# Patient Record
Sex: FEMALE | Race: WHITE | URBAN - METROPOLITAN AREA
[De-identification: names, ages, dates, MRNs, and addresses within clinical notes are randomized per-mention and may not be internally consistent; named-entity substitution may affect disease eponyms.]

---

## 2018-09-04 ENCOUNTER — OFFICE VISIT (OUTPATIENT)
Dept: ORTHOPEDIC SURGERY | Age: 58
End: 2018-09-04

## 2018-09-04 VITALS
SYSTOLIC BLOOD PRESSURE: 156 MMHG | BODY MASS INDEX: 30.82 KG/M2 | HEART RATE: 63 BPM | DIASTOLIC BLOOD PRESSURE: 89 MMHG | WEIGHT: 185 LBS | HEIGHT: 65 IN

## 2018-09-04 DIAGNOSIS — M25.561 RIGHT KNEE PAIN, UNSPECIFIED CHRONICITY: Primary | ICD-10-CM

## 2018-09-04 DIAGNOSIS — M17.11 OSTEOARTHRITIS OF RIGHT KNEE, UNSPECIFIED OSTEOARTHRITIS TYPE: ICD-10-CM

## 2018-09-04 PROCEDURE — 20610 DRAIN/INJ JOINT/BURSA W/O US: CPT | Performed by: ORTHOPAEDIC SURGERY

## 2018-09-04 PROCEDURE — 99204 OFFICE O/P NEW MOD 45 MIN: CPT | Performed by: ORTHOPAEDIC SURGERY

## 2018-09-07 NOTE — PROGRESS NOTES
Cortisone injection: right knee     2cc methylprednisolone 40mg    Lot:[id number]  Exp:09/2019  ZOO:0138-4512-28    4cc ropivocaine 0.5%    KYLIE:[id number]  Exp:02/2022  Ndc: 36151-951-78
Review of Systems   Constitutional:        Recent weight change    Cardiovascular:        Hypertension    Musculoskeletal: Positive for joint pain. Right knee pain    All other systems reviewed and are negative.
stability: Stable to valgus and varus stress at 0° and 30°. Solid endpoint with Lachman's. Negative posterior and anterior drawer signs. Patella tracking: Smooth translation of patella. Negative J sign. No retinacular tenderness. Special tests: Negative Christofer sign. Patella apprehension sign negative. Neurologic and vascular: Skin is warm and well-perfused. Distally neurovascularly intact. Additional findings: Calf soft nontender. Sensation is intact to light-touch. No pretibial edema. Diagnostics:  Radiology:     Radiographs were obtained and reviewed in the office; 4 views: bilateral PA, bilateral Harlin Eliana, bilateral Merchants AND right lateral show mild medial spurring as well as some excessive lateral tilt and lateral patellofemoral joint space loss. Impression: Early osteoarthritic change right knee. Assessment:     Osteoarthritis right knee    Plan: Miss Mary Anne Jessica is a very pleasant 59-year-old female is seen today in clinic for complaints of right knee pain. Imaging going for about 1 month. She reports that it is hard to do her daily activities she would like to do something to help her get over knee pain quickly. She was offered an injection today. She received the injection and got good relief. She tolerated the injection well. She was started on a home exercise program for knee arthritis. Her questions were sought and answered and we will see her back on a p.r.n. basis. Samira Rowe is in agreement with this plan. All questions were answered to patient's satisfaction and was encouraged to call with any further questions. The indications for and risks of steroid injection as well as treatment alternatives were discussed with the patient who consented to the procedure. Under sterile conditions and after informed consent was obtained the patient was given an injection into the RIGHT knee.   80 mg of Depo-Medrol and 4 mL of 1% lidocaine were placed in the knee after it
stability: Stable to valgus and varus stress at 0° and 30°. Solid endpoint with Lachman's. Negative posterior and anterior drawer signs. Patella tracking: Smooth translation of patella. Negative J sign. No retinacular tenderness. Special tests: Negative Christofer sign. Patella apprehension sign negative. Neurologic and vascular: Skin is warm and well-perfused. Distally neurovascularly intact. Additional findings: Calf soft nontender. Sensation is intact to light-touch. No pretibial edema. Diagnostics:  Radiology:     Radiographs were obtained and reviewed in the office; 4 views: bilateral PA, bilateral Vkiy Oiler, bilateral Merchants AND right lateral show mild medial spurring as well as some excessive lateral tilt and lateral patellofemoral joint space loss. Impression: Early osteoarthritic change right knee. Assessment:     Osteoarthritis right knee    Plan: Miss Carlo Bean is a very pleasant 63-year-old female is seen today in clinic for complaints of right knee pain. Imaging going for about 1 month. She reports that it is hard to do her daily activities she would like to do something to help her get over knee pain quickly. She was offered an injection today. She received the injection and got good relief. She tolerated the injection well. She was started on a home exercise program for knee arthritis. Her questions were sought and answered and we will see her back on a p.r.n. basis. Jack Rowe is in agreement with this plan. All questions were answered to patient's satisfaction and was encouraged to call with any further questions. The indications for and risks of steroid injection as well as treatment alternatives were discussed with the patient who consented to the procedure. Under sterile conditions and after informed consent was obtained the patient was given an injection into the RIGHT knee.   80 mg of Depo-Medrol and 4 mL of 1% lidocaine were placed in the knee after it

## 2023-07-17 ENCOUNTER — TELEPHONE (OUTPATIENT)
Dept: ORTHOPEDIC SURGERY | Age: 63
End: 2023-07-17

## 2023-07-17 NOTE — TELEPHONE ENCOUNTER
Appointment Request     Patient requesting earlier appointment: Yes  Appointment offered to patient: 07/26/23 @ 9:45 AM  Patient Contact Number: [phone]    PLEASE CALL PT AT ABOVE NUMBER TO ADVISE IF ABLE TO GET IN SOONER.  KNEE KEEPS HYPEREXTENDING.

## 2023-07-18 SDOH — HEALTH STABILITY: PHYSICAL HEALTH: ON AVERAGE, HOW MANY DAYS PER WEEK DO YOU ENGAGE IN MODERATE TO STRENUOUS EXERCISE (LIKE A BRISK WALK)?: 0 DAYS

## 2023-07-18 SDOH — HEALTH STABILITY: PHYSICAL HEALTH: ON AVERAGE, HOW MANY MINUTES DO YOU ENGAGE IN EXERCISE AT THIS LEVEL?: 0 MIN

## 2023-07-18 ASSESSMENT — SOCIAL DETERMINANTS OF HEALTH (SDOH)

## 2023-07-19 ENCOUNTER — OFFICE VISIT (OUTPATIENT)
Dept: ORTHOPEDIC SURGERY | Age: 63
End: 2023-07-19

## 2023-07-19 VITALS — HEIGHT: 64 IN | BODY MASS INDEX: 37.56 KG/M2 | WEIGHT: 220 LBS

## 2023-07-19 DIAGNOSIS — M25.562 LEFT KNEE PAIN, UNSPECIFIED CHRONICITY: Primary | ICD-10-CM

## 2023-07-19 DIAGNOSIS — M17.11 PRIMARY OSTEOARTHRITIS OF RIGHT KNEE: ICD-10-CM

## 2023-07-19 RX ORDER — METHYLPREDNISOLONE ACETATE 40 MG/ML
40 INJECTION, SUSPENSION INTRA-ARTICULAR; INTRALESIONAL; INTRAMUSCULAR; SOFT TISSUE ONCE
Status: COMPLETED | OUTPATIENT
Start: 2023-07-19 | End: 2023-07-19

## 2023-07-19 RX ADMIN — METHYLPREDNISOLONE ACETATE 40 MG: 40 INJECTION, SUSPENSION INTRA-ARTICULAR; INTRALESIONAL; INTRAMUSCULAR; SOFT TISSUE at 17:17

## 2023-07-19 NOTE — PROGRESS NOTES
Date:  2023    Name:  [facility]  Address:  [address] [place] [address]    :  1960      Age:   61 y.o.    SSN:        Medical Record Number:  [id number]    Reason for Visit:    Chief Complaint    Knee Pain (Old patient / new problem left knee )      DOS:2023     HPI: Cierra Murillo is a 61 y.o. female here today for evaluation of left knee pain that has been ongoing for 3 days. On  she states she hyperextended her left knee stepping down a step. She reports significant pain and swelling following the incident which has improved, however she still feels unstable with walking. Denies any catching or locking. She has not had any formal treatment for this issue. She has also been seen previously for her right knee and diagnosed with right knee osteoarthritis. Treatment has included home exercises, anti-inflammatories and corticosteroid injection on 2018. She presents today with an exacerbation of right knee swelling and stiffness. Pain is worse with walking. Denies any new injuries. Pain Assessment  Location of Pain: Knee  Location Modifiers: Left  Severity of Pain: 3  Quality of Pain: Sharp, Aching  Duration of Pain: Persistent  Frequency of Pain: Intermittent  Aggravating Factors: Straightening  Limiting Behavior: Yes  Relieving Factors: Rest  Result of Injury: No  Work-Related Injury: No  Are there other pain locations you wish to document?: No  ROS: Review of systems reviewed from Patient History Form completed today and available in the patient's chart under the Media tab. Past Medical History:   Diagnosis Date    Hypertension         Past Surgical History:   Procedure Laterality Date    CHOLECYSTECTOMY      GASTRIC BYPASS SURGERY         History reviewed. No pertinent family history.     Social History     Socioeconomic History    Marital status:      Spouse name: None    Number of children: None    Years of education: None    Highest education

## 2023-07-19 NOTE — PROGRESS NOTES
7/19/23 11:44 AM     Lidocaine Injection      NDC: [address]-08    Lot Number: [id number]    Body Part: left knee

## 2023-07-20 ENCOUNTER — TELEPHONE (OUTPATIENT)
Dept: ORTHOPEDIC SURGERY | Age: 63
End: 2023-07-20

## 2023-07-20 NOTE — TELEPHONE ENCOUNTER
General Question     Subject: [id number]  Patient and /or Facility Request: Wendy Mccormick  Contact Number: [phone]    FUAD FROM [facility] CALLED WITH THE APPROVAL OF THE AUTH FOR THE MRI OF THE LEG WITHOUT CONTRAST.   Andrzej Quinones # IS [id number]

## 2025-04-04 LAB
A/G RATIO: 1.6 RATIO (ref 0.8–2.6)
ALBUMIN: 4.2 G/DL (ref 3.5–5.2)
ALP BLD-CCNC: 64 U/L (ref 23–144)
ALT SERPL-CCNC: 16 U/L (ref 0–60)
AST SERPL-CCNC: 23 U/L (ref 0–55)
BILIRUB SERPL-MCNC: 0.2 MG/DL (ref 0–1.2)
BUN / CREAT RATIO: 33 (ref 7–25)
BUN BLDV-MCNC: 26 MG/DL (ref 3–29)
CALCIUM SERPL-MCNC: 9.2 MG/DL (ref 8.5–10.5)
CHLORIDE BLD-SCNC: 105 MEQ/L (ref 96–110)
CO2: 28 MEQ/L (ref 19–32)
CREAT SERPL-MCNC: 0.8 MG/DL (ref 0.5–1.2)
ESTIMATED GLOMERULAR FILTRATION RATE CREATININE EQUATION: 82 MLS/MIN/1.73M2
FASTING STATUS: ABNORMAL
GLOBULIN: 2.7 G/DL (ref 1.9–3.6)
GLUCOSE BLD-MCNC: 99 MG/DL (ref 70–99)
POTASSIUM SERPL-SCNC: 4.2 MEQ/L (ref 3.4–5.3)
SODIUM BLD-SCNC: 142 MEQ/L (ref 135–148)
TOTAL PROTEIN: 6.9 G/DL (ref 6–8.3)